# Patient Record
Sex: FEMALE | Race: BLACK OR AFRICAN AMERICAN | Employment: PART TIME | URBAN - METROPOLITAN AREA
[De-identification: names, ages, dates, MRNs, and addresses within clinical notes are randomized per-mention and may not be internally consistent; named-entity substitution may affect disease eponyms.]

---

## 2024-08-07 ENCOUNTER — APPOINTMENT (OUTPATIENT)
Dept: GENERAL RADIOLOGY | Facility: HOSPITAL | Age: 39
End: 2024-08-07
Attending: EMERGENCY MEDICINE

## 2024-08-07 ENCOUNTER — HOSPITAL ENCOUNTER (EMERGENCY)
Facility: HOSPITAL | Age: 39
Discharge: HOME | End: 2024-08-08
Attending: EMERGENCY MEDICINE

## 2024-08-07 VITALS
RESPIRATION RATE: 17 BRPM | SYSTOLIC BLOOD PRESSURE: 136 MMHG | BODY MASS INDEX: 22.73 KG/M2 | TEMPERATURE: 99 F | DIASTOLIC BLOOD PRESSURE: 84 MMHG | HEIGHT: 68 IN | HEART RATE: 65 BPM | WEIGHT: 150 LBS | OXYGEN SATURATION: 98 %

## 2024-08-07 DIAGNOSIS — R06.00 DYSPNEA, UNSPECIFIED TYPE: Primary | ICD-10-CM

## 2024-08-07 LAB
ANION GAP SERPL CALC-SCNC: 6 MMOL/L (ref 0–18)
BASOPHILS # BLD AUTO: 0.08 X10(3) UL (ref 0–0.2)
BASOPHILS NFR BLD AUTO: 1.1 %
BUN BLD-MCNC: 9 MG/DL (ref 9–23)
BUN/CREAT SERPL: 12.5 (ref 10–20)
CALCIUM BLD-MCNC: 9.1 MG/DL (ref 8.7–10.4)
CHLORIDE SERPL-SCNC: 113 MMOL/L (ref 98–112)
CO2 SERPL-SCNC: 24 MMOL/L (ref 21–32)
CREAT BLD-MCNC: 0.72 MG/DL
DEPRECATED RDW RBC AUTO: 42.2 FL (ref 35.1–46.3)
EGFRCR SERPLBLD CKD-EPI 2021: 108 ML/MIN/1.73M2 (ref 60–?)
EOSINOPHIL # BLD AUTO: 0.29 X10(3) UL (ref 0–0.7)
EOSINOPHIL NFR BLD AUTO: 4.1 %
ERYTHROCYTE [DISTWIDTH] IN BLOOD BY AUTOMATED COUNT: 13.5 % (ref 11–15)
FLUAV + FLUBV RNA SPEC NAA+PROBE: NEGATIVE
FLUAV + FLUBV RNA SPEC NAA+PROBE: NEGATIVE
GLUCOSE BLD-MCNC: 86 MG/DL (ref 70–99)
HCT VFR BLD AUTO: 41.5 %
HGB BLD-MCNC: 14.3 G/DL
IMM GRANULOCYTES # BLD AUTO: 0.03 X10(3) UL (ref 0–1)
IMM GRANULOCYTES NFR BLD: 0.4 %
LYMPHOCYTES # BLD AUTO: 2.33 X10(3) UL (ref 1–4)
LYMPHOCYTES NFR BLD AUTO: 32.6 %
MCH RBC QN AUTO: 29.7 PG (ref 26–34)
MCHC RBC AUTO-ENTMCNC: 34.5 G/DL (ref 31–37)
MCV RBC AUTO: 86.1 FL
MONOCYTES # BLD AUTO: 0.72 X10(3) UL (ref 0.1–1)
MONOCYTES NFR BLD AUTO: 10.1 %
NEUTROPHILS # BLD AUTO: 3.69 X10 (3) UL (ref 1.5–7.7)
NEUTROPHILS # BLD AUTO: 3.69 X10(3) UL (ref 1.5–7.7)
NEUTROPHILS NFR BLD AUTO: 51.7 %
OSMOLALITY SERPL CALC.SUM OF ELEC: 294 MOSM/KG (ref 275–295)
PLATELET # BLD AUTO: 236 10(3)UL (ref 150–450)
POTASSIUM SERPL-SCNC: 3.1 MMOL/L (ref 3.5–5.1)
RBC # BLD AUTO: 4.82 X10(6)UL
RSV RNA SPEC NAA+PROBE: NEGATIVE
SARS-COV-2 RNA RESP QL NAA+PROBE: NOT DETECTED
SODIUM SERPL-SCNC: 143 MMOL/L (ref 136–145)
TROPONIN I SERPL HS-MCNC: 4 NG/L
WBC # BLD AUTO: 7.1 X10(3) UL (ref 4–11)

## 2024-08-07 PROCEDURE — 80048 BASIC METABOLIC PNL TOTAL CA: CPT | Performed by: EMERGENCY MEDICINE

## 2024-08-07 PROCEDURE — 99285 EMERGENCY DEPT VISIT HI MDM: CPT

## 2024-08-07 PROCEDURE — 85025 COMPLETE CBC W/AUTO DIFF WBC: CPT | Performed by: EMERGENCY MEDICINE

## 2024-08-07 PROCEDURE — 96374 THER/PROPH/DIAG INJ IV PUSH: CPT

## 2024-08-07 PROCEDURE — 71045 X-RAY EXAM CHEST 1 VIEW: CPT | Performed by: EMERGENCY MEDICINE

## 2024-08-07 PROCEDURE — 94645 CONT INHLJ TX EACH ADDL HOUR: CPT

## 2024-08-07 PROCEDURE — 84484 ASSAY OF TROPONIN QUANT: CPT | Performed by: EMERGENCY MEDICINE

## 2024-08-07 PROCEDURE — 93005 ELECTROCARDIOGRAM TRACING: CPT

## 2024-08-07 PROCEDURE — 94640 AIRWAY INHALATION TREATMENT: CPT

## 2024-08-07 PROCEDURE — 0241U SARS-COV-2/FLU A AND B/RSV BY PCR (GENEXPERT): CPT | Performed by: EMERGENCY MEDICINE

## 2024-08-07 RX ORDER — IPRATROPIUM BROMIDE AND ALBUTEROL SULFATE 2.5; .5 MG/3ML; MG/3ML
3 SOLUTION RESPIRATORY (INHALATION) ONCE
Status: COMPLETED | OUTPATIENT
Start: 2024-08-07 | End: 2024-08-07

## 2024-08-07 RX ORDER — ALBUTEROL SULFATE 90 UG/1
2 AEROSOL, METERED RESPIRATORY (INHALATION) EVERY 4 HOURS PRN
Qty: 1 EACH | Refills: 0 | Status: SHIPPED | OUTPATIENT
Start: 2024-08-07 | End: 2024-09-06

## 2024-08-07 RX ORDER — PREDNISONE 20 MG/1
40 TABLET ORAL DAILY
Qty: 10 TABLET | Refills: 0 | Status: SHIPPED | OUTPATIENT
Start: 2024-08-07 | End: 2024-08-12

## 2024-08-07 RX ORDER — METHYLPREDNISOLONE SODIUM SUCCINATE 125 MG/2ML
125 INJECTION, POWDER, LYOPHILIZED, FOR SOLUTION INTRAMUSCULAR; INTRAVENOUS ONCE
Status: COMPLETED | OUTPATIENT
Start: 2024-08-07 | End: 2024-08-07

## 2024-08-07 RX ORDER — ALBUTEROL SULFATE 5 MG/ML
10 SOLUTION RESPIRATORY (INHALATION) ONCE
Status: COMPLETED | OUTPATIENT
Start: 2024-08-07 | End: 2024-08-07

## 2024-08-08 LAB
ATRIAL RATE: 75 BPM
P AXIS: 69 DEGREES
P-R INTERVAL: 140 MS
Q-T INTERVAL: 402 MS
QRS DURATION: 80 MS
QTC CALCULATION (BEZET): 448 MS
R AXIS: 54 DEGREES
T AXIS: 17 DEGREES
VENTRICULAR RATE: 75 BPM

## 2024-08-08 NOTE — ED INITIAL ASSESSMENT (HPI)
Asthma exacerbation. Used inhaler and neb at home without relief. Patient with pursed lip breathing speaking in 3-4 word sentences. Wheezing present.

## 2024-08-08 NOTE — ED PROVIDER NOTES
[facility]  Emergency Department Attending Note     Chief Complaint:   Chief Complaint   Patient presents with    Asthma     HISTORY OF PRESENT ILLNESS:   Lakisha Magdaleno is a 39 year old female who presents to the ED with a past medical history including asthma presents with a complaint of dyspnea for the last day.  Occasional dry cough.  Wheezing.  Feels identical to prior asthma exacerbation.  Denies chest pain.  Denies hemoptysis.  Denies any unilateral or bilateral leg pain swelling or tenderness or recent immobilization.  No fevers or chills     MEDICAL & SOCIAL HISTORY:   Past Medical History:    Arthritis    History reviewed. No pertinent surgical history.   Social History     Socioeconomic History    Marital status: Single   Tobacco Use    Smoking status: Some Days     Current packs/day: 0.50     Types: Cigarettes    Smokeless tobacco: Never      Allergies   Allergen Reactions    Penicillins UNKNOWN      Current Outpatient Medications   Medication Sig Dispense Refill    albuterol 108 (90 Base) MCG/ACT Inhalation Aero Soln Inhale 2 puffs into the lungs every 4 (four) hours as needed for Wheezing. 1 each 0    predniSONE 20 MG Oral Tab Take 2 tablets (40 mg total) by mouth daily for 5 days. 10 tablet 0          REVIEW OF SYSTEMS   A 10 point review of systems was completed and is negative except as listed in history of present illness      PHYSICAL EXAM   Vitals: /75   Pulse 68   Temp 98.6 °F (37 °C)   Resp 21   Ht 172.7 cm (5' 8\")   Wt 68 kg   LMP 07/16/2024 (Approximate)   SpO2 96%   BMI 22.81 kg/m²   /75   Pulse 68   Temp 98.6 °F (37 °C)   Resp 21   Ht 172.7 cm (5' 8\")   Wt 68 kg   LMP 07/16/2024 (Approximate)   SpO2 96%   BMI 22.81 kg/m²     General: A&Ox3, NAD  Constitutional: Well developed, well nourished, nontoxic  Head: atraumatic, normocephalic   Eyes: conjuctiva clear, no icterus, PERRL, EOMI, vision grossly normal  Ears: normal external appearance, no  drainage  Nose:  Atraumatic, no swelling, no drainage, nares patent  Throat:  Moist pink mucous membranes, airway is patent  Neck:  Soft supple, no masses, no tracheal deviation, no stridor  Chest:  No bruising or abrasions, no tenderness, no deformity  Cardiac:  Regular rate and rhythm  Lung:  No distress, no retractions. Clear to auscultation bilaterally, wheezing throughout  Abdomen:  Soft, nontender, nondistended, normal BS  Extremities: FROM all ext, no deformities, intact equal peripheral pulses, no cyanosis or edema  Neuro: No facial droop, no slurred speech, moving all extremities freely, SILT to the bilateral upper and lower extremities  Psych: A&Ox3, normal affect, cooperative, calm  Skin: No rash, no petechiae/purpura, warm, dry      RESULTS  LABS:   Results for orders placed or performed during the hospital encounter of 08/07/24   CBC With Differential With Platelet   Result Value Ref Range    WBC 7.1 4.0 - 11.0 x10(3) uL    RBC 4.82 3.80 - 5.30 x10(6)uL    HGB 14.3 12.0 - 16.0 g/dL    HCT 41.5 35.0 - 48.0 %    MCV 86.1 80.0 - 100.0 fL    MCH 29.7 26.0 - 34.0 pg    MCHC 34.5 31.0 - 37.0 g/dL    RDW-SD 42.2 35.1 - 46.3 fL    RDW 13.5 11.0 - 15.0 %    .0 150.0 - 450.0 10(3)uL    Neutrophil Absolute Prelim 3.69 1.50 - 7.70 x10 (3) uL    Neutrophil Absolute 3.69 1.50 - 7.70 x10(3) uL    Lymphocyte Absolute 2.33 1.00 - 4.00 x10(3) uL    Monocyte Absolute 0.72 0.10 - 1.00 x10(3) uL    Eosinophil Absolute 0.29 0.00 - 0.70 x10(3) uL    Basophil Absolute 0.08 0.00 - 0.20 x10(3) uL    Immature Granulocyte Absolute 0.03 0.00 - 1.00 x10(3) uL    Neutrophil % 51.7 %    Lymphocyte % 32.6 %    Monocyte % 10.1 %    Eosinophil % 4.1 %    Basophil % 1.1 %    Immature Granulocyte % 0.4 %   Basic Metabolic Panel (8)   Result Value Ref Range    Glucose 86 70 - 99 mg/dL    Sodium 143 136 - 145 mmol/L    Potassium 3.1 (L) 3.5 - 5.1 mmol/L    Chloride 113 (H) 98 - 112 mmol/L    CO2 24.0 21.0 - 32.0 mmol/L    Anion Gap 6  0 - 18 mmol/L    BUN 9 9 - 23 mg/dL    Creatinine 0.72 0.55 - 1.02 mg/dL    BUN/CREA Ratio 12.5 10.0 - 20.0    Calcium, Total 9.1 8.7 - 10.4 mg/dL    Calculated Osmolality 294 275 - 295 mOsm/kg    eGFR-Cr 108 >=60 mL/min/1.73m2   Troponin I (High Sensitivity)   Result Value Ref Range    Troponin I (High Sensitivity) 4 <=34 ng/L   SARS-CoV-2/Flu A and B/RSV by PCR (GeneXpert)    Specimen: Nares; Other   Result Value Ref Range    SARS-CoV-2 (COVID-19) - (GeneXpert) Not Detected Not Detected    Influenza A by PCR Negative Negative    Influenza B by PCR Negative Negative    RSV by PCR Negative Negative         IMAGING: XR CHEST AP PORTABLE  (CPT=71045)    Result Date: 8/7/2024  CONCLUSION:   No acute cardiopulmonary abnormality.    Dictated by (CST): Christopher Keith MD on 8/07/2024 at 9:21 PM     Finalized by (CST): Christopher Keith MD on 8/07/2024 at 9:22 PM           I personally reviewed the radiology study and my finding were no acute intrathoracic process      Procedures:   Procedures     ED COURSE          Re-Evaluation: improved      Disposition & Plan:   Clinical Impression/Final Diagnosis:   1. Dyspnea, unspecified type        Medical Decision Making: Patient arrived with some dyspnea, wheezing on exam, PERC negative, EKG without overt signs of acute ischemia and low troponin makes acute coronary syndrome much less likely, feels identical to prior asthma exacerbation.  Given DuoNeb, continuous neb and a second continuous neb in the emergency department with steady improvement in symptoms.  Responded well to respiratory resuscitation, offered discharge home and outpatient treatment versus admission patient opts for outpatient treatment.  Will give steroids in the emergency department and prescription for burst steroid at home and more bronchodilators.  The patient assures me she will follow-up with her doctor in the next day or 2 and return immediately for any worsening symptoms or new concerns.  She verbalized  Detail Level: Simple understanding and agreement with this plan    Medical Decision Making  Amount and/or Complexity of Data Reviewed  External Data Reviewed: notes.  Labs: ordered. Decision-making details documented in ED Course.  Radiology: ordered and independent interpretation performed. Decision-making details documented in ED Course.  ECG/medicine tests: ordered and independent interpretation performed. Decision-making details documented in ED Course.    Risk  OTC drugs.  Prescription drug management.  Decision regarding hospitalization.    Critical Care  Total time providing critical care: 42 minutes        Disposition: Discharge  There are no disposition comments on file for this visit.     This note was generated in part using voice recognition dictation technology. The report was reviewed by this physician but still may have unintentional errors due to inherent limitations of voice recognition technology. All times are estimates.     Detail Level: Zone